# Patient Record
Sex: MALE
[De-identification: names, ages, dates, MRNs, and addresses within clinical notes are randomized per-mention and may not be internally consistent; named-entity substitution may affect disease eponyms.]

---

## 2018-09-06 ENCOUNTER — HOSPITAL ENCOUNTER (OUTPATIENT)
Dept: HOSPITAL 14 - H.ER | Age: 73
Setting detail: OBSERVATION
LOS: 1 days | Discharge: HOME | End: 2018-09-07
Attending: GENERAL ACUTE CARE HOSPITAL | Admitting: GENERAL ACUTE CARE HOSPITAL
Payer: MEDICARE

## 2018-09-06 DIAGNOSIS — K29.70: ICD-10-CM

## 2018-09-06 DIAGNOSIS — Z85.07: ICD-10-CM

## 2018-09-06 DIAGNOSIS — Z86.711: ICD-10-CM

## 2018-09-06 DIAGNOSIS — E78.00: ICD-10-CM

## 2018-09-06 DIAGNOSIS — R07.9: Primary | ICD-10-CM

## 2018-09-06 DIAGNOSIS — I25.10: ICD-10-CM

## 2018-09-06 DIAGNOSIS — Z95.5: ICD-10-CM

## 2018-09-06 DIAGNOSIS — Z95.1: ICD-10-CM

## 2018-09-06 DIAGNOSIS — Z91.013: ICD-10-CM

## 2018-09-06 DIAGNOSIS — I10: ICD-10-CM

## 2018-09-06 LAB
ALBUMIN SERPL-MCNC: 3.7 G/DL (ref 3.5–5)
ALBUMIN/GLOB SERPL: 1.4 {RATIO} (ref 1–2.1)
ALT SERPL-CCNC: 45 U/L (ref 21–72)
APTT BLD: 28 SECONDS (ref 25.6–37.1)
AST SERPL-CCNC: 68 U/L (ref 17–59)
BASOPHILS # BLD AUTO: 0 K/UL (ref 0–0.2)
BASOPHILS NFR BLD: 0.2 % (ref 0–2)
BNP SERPL-MCNC: 266 PG/ML (ref 0–900)
BUN SERPL-MCNC: 19 MG/DL (ref 9–20)
CALCIUM SERPL-MCNC: 8.8 MG/DL (ref 8.4–10.2)
EOSINOPHIL # BLD AUTO: 0.1 K/UL (ref 0–0.7)
EOSINOPHIL NFR BLD: 1.1 % (ref 0–4)
ERYTHROCYTE [DISTWIDTH] IN BLOOD BY AUTOMATED COUNT: 12.4 % (ref 11.5–14.5)
GFR NON-AFRICAN AMERICAN: > 60
HGB BLD-MCNC: 12.5 G/DL (ref 12–18)
INR PPP: 1
LYMPHOCYTES # BLD AUTO: 1.2 K/UL (ref 1–4.3)
LYMPHOCYTES NFR BLD AUTO: 12.9 % (ref 20–40)
MCH RBC QN AUTO: 31.6 PG (ref 27–31)
MCHC RBC AUTO-ENTMCNC: 33.4 G/DL (ref 33–37)
MCV RBC AUTO: 94.5 FL (ref 80–94)
MONOCYTES # BLD: 0.6 K/UL (ref 0–0.8)
MONOCYTES NFR BLD: 7 % (ref 0–10)
NEUTROPHILS # BLD: 7.3 K/UL (ref 1.8–7)
NEUTROPHILS NFR BLD AUTO: 78.8 % (ref 50–75)
NRBC BLD AUTO-RTO: 0 % (ref 0–0)
PLATELET # BLD: 252 K/UL (ref 130–400)
PMV BLD AUTO: 8.1 FL (ref 7.2–11.7)
PROTHROMBIN TIME: 10.7 SECONDS (ref 9.8–13.1)
RBC # BLD AUTO: 3.94 MIL/UL (ref 4.4–5.9)
WBC # BLD AUTO: 9.2 K/UL (ref 4.8–10.8)

## 2018-09-06 PROCEDURE — 36415 COLL VENOUS BLD VENIPUNCTURE: CPT

## 2018-09-06 PROCEDURE — 80053 COMPREHEN METABOLIC PANEL: CPT

## 2018-09-06 PROCEDURE — 84100 ASSAY OF PHOSPHORUS: CPT

## 2018-09-06 PROCEDURE — 93005 ELECTROCARDIOGRAM TRACING: CPT

## 2018-09-06 PROCEDURE — 85025 COMPLETE CBC W/AUTO DIFF WBC: CPT

## 2018-09-06 PROCEDURE — 83880 ASSAY OF NATRIURETIC PEPTIDE: CPT

## 2018-09-06 PROCEDURE — 99285 EMERGENCY DEPT VISIT HI MDM: CPT

## 2018-09-06 PROCEDURE — 71046 X-RAY EXAM CHEST 2 VIEWS: CPT

## 2018-09-06 PROCEDURE — 86850 RBC ANTIBODY SCREEN: CPT

## 2018-09-06 PROCEDURE — 86900 BLOOD TYPING SEROLOGIC ABO: CPT

## 2018-09-06 PROCEDURE — 85730 THROMBOPLASTIN TIME PARTIAL: CPT

## 2018-09-06 PROCEDURE — 85610 PROTHROMBIN TIME: CPT

## 2018-09-06 PROCEDURE — 84484 ASSAY OF TROPONIN QUANT: CPT

## 2018-09-06 RX ADMIN — PANTOPRAZOLE SODIUM SCH MG: 40 TABLET, DELAYED RELEASE ORAL at 22:09

## 2018-09-06 NOTE — ED PDOC
HPI: Chest Pain


Time Seen by Provider: 09/06/18 16:54


Chief Complaint (Nursing): Chest Pain


Chief Complaint (Provider): Chest pain


History Per: Patient


History/Exam Limitations: no limitations


Onset/Duration Of Symptoms: Hrs


Current Symptoms Are (Timing): Gone Now


Quality: Squeezing


Nitro Therapy Administered: 1, Per EMS, Complete Relief


Additional History Per: Patient


Additional Complaint(s): 


74yo male, history of hypertension, CAD, gastritis, pancreatic cancer, comes to 

ER for evaluation of chest pain, onset 30 minutes prior to arrival while 

driving his car. Patient states pain was epigastric and radiating to mid-sternum

, pressure-like and constant. Patient was given NTG SL by EMS after which he 

reports feeling much better. He denies any shortness of breath or vomiting; he 

does have associated nausea. Patient states he was feeling normal state of 

health prior to chest pain but states this morning when taking his blood 

pressure, it was higher than usual. On my evaluation, patient is asymptomatic 

and reports feeling much better.





PMD: Clinic





Past Medical History


Reviewed: Historical Data, Nursing Documentation, Vital Signs


Vital Signs: 


 Last Vital Signs











Temp  97.9 F   09/07/18 12:25


 


Pulse  59 L  09/07/18 12:25


 


Resp  18   09/07/18 12:25


 


BP  152/69 H  09/07/18 12:25


 


Pulse Ox  98   09/07/18 12:25














- Medical History


PMH: CAD, Gastritis, HTN, Hypercholesterolemia, Malignancy (pancreatic cancer)


   Denies: HIV, Chronic Kidney Disease





- Surgical History


Surgical History: CABG, Hernia Repair





- Family History


Family History: States: Hypertension





- Immunization History


Hx Tetanus Toxoid Vaccination: No


Hx Influenza Vaccination: Yes


Hx Pneumococcal Vaccination: No





- Home Medications


Home Medications: 


 Ambulatory Orders











 Medication  Instructions  Recorded


 


Aspirin/Dipyridamole [Aggrenox 25 1 cap PO Q12 09/06/18





mg-200 mg Capsule]  


 


Atorvastatin [Lipitor] 10 mg PO HS 09/06/18


 


Carvedilol [Coreg] 3.125 mg PO Q12 09/06/18


 


Multivitamin [Multi-Vitamin Daily] 1 tab PO DAILY 09/06/18


 


Aluminum Hydroxide/Magnesium H 30 ml PO QID PRN #1 udc 09/07/18





[Maalox 30 ml]  


 


Amlodipine Besylate/Benazepril 1 cap PO DAILY #30 cap 09/07/18





[Lotrel 10 mg-40 mg]  


 


Pantoprazole Sodium [Protonix] 40 mg PO DAILY #30 ect 09/07/18


 


Pantoprazole [Protonix EC Tab] 40 mg PO DAILY #14 ect 09/07/18














- Allergies


Allergies/Adverse Reactions: 


 Allergies











Allergy/AdvReac Type Severity Reaction Status Date / Time


 


clopidogrel bisulfate Allergy  RASH Verified 09/06/18 16:47





[From Plavix]     


 


shellfish derived Allergy  VOMITING Verified 09/06/18 16:47














Review of Systems


ROS Statement: Except As Marked, All Systems Reviewed And Found Negative


Cardiovascular: Positive for: Chest Pain (now resolved)


Respiratory: Negative for: Shortness of Breath


Gastrointestinal: Positive for: Nausea.  Negative for: Vomiting





Physical Exam





- Reviewed


Nursing Documentation Reviewed: Yes


Vital Signs Reviewed: Yes





- Physical Exam


Appears: Positive for: Well, Non-toxic, No Acute Distress


Head Exam: Positive for: ATRAUMATIC, NORMAL INSPECTION, NORMOCEPHALIC


Skin: Positive for: Normal Color, Warm, DRY


Eye Exam: Positive for: EOMI, Normal appearance, PERRL


ENT: Positive for: Normal ENT Inspection


Neck: Positive for: Normal, Painless ROM


Cardiovascular/Chest: Positive for: Regular Rate, Rhythm


Respiratory: Positive for: CNT, Normal Breath Sounds


Gastrointestinal/Abdominal: Positive for: Normal Exam, Soft


Back: Positive for: Normal Inspection


Extremity: Positive for: Normal ROM


Neurologic/Psych: Positive for: Alert, Oriented





- Laboratory Results


Result Diagrams: 


 09/06/18 17:34





 09/06/18 17:34





- ECG


ECG: Positive for: Interpreted By Me, Viewed By Me


ECG Rhythm: Positive for: Normal QRS, Normal ST Segment, Sinus Rhythm.  

Negative for: ST/T Changes


Rate: 66


O2 Sat by Pulse Oximetry: 100 (RA)


Pulse Ox Interpretation: Normal





Medical Decision Making


Medical Decision Making: 


Impression: Chest pain w/ cardiac risk factors


Plan:


-- Chest x-ray


-- EKG


-- Labs





1926


Case discussed with Dr Regan, and patient to be admitted to [facility] due to 

chest pain.











--------------------------------------------------------------------------------

-----------------


Scribe Attestation:


Documented by Azul Wagner, acting as a scribe for Antonia Preston MD





Provider Scribe Attestation:


All medical record entries made by the Scribe were at my direction and 

personally dictated by me. I have reviewed the chart and agree that the record 

accurately reflects my personal performance of the history, physical exam, 

medical decision making, and the department course for this patient. I have 

also personally directed, reviewed, and agree with the discharge instructions 

and disposition.





Disposition





- Clinical Impression


Clinical Impression: 


 CAD (coronary artery disease), Chest pain








- Disposition


Disposition Time: 19:00


Condition: FAIR





- Pt Status Changed To:


Hospital Disposition Of: Observation





- POA


Present On Arrival: None

## 2018-09-06 NOTE — RAD
HISTORY:

Chest pain  



COMPARISON:

Chest x-ray performed 11/17/16 



TECHNIQUE:

Chest PA and lateral



FINDINGS:





LUNGS:

No focal consolidation.



Please note that chest x-ray has limited sensitivity for the 

detection of pulmonary masses.



PLEURA:

No significant pleural effusion identified. No definite pneumothorax .



CARDIOVASCULAR:

Median sternotomy wires with evidence of CABG. 



OSSEOUS STRUCTURES:

 Degenerative changes of the spine.



VISUALIZED UPPER ABDOMEN:

Unremarkable.



OTHER FINDINGS:

None.



IMPRESSION:

Median sternotomy wires with evidence of CABG.

## 2018-09-06 NOTE — CP.PCM.HP
<[place] - Last Filed: 18 00:56>





History of Present Illness





- History of Present Illness


History of Present Illness: 


73 yr old M presents to ED via ambulance with complaint of epigastric pain 

radiating upwards which started at 5pm while he was driving. PMHx includes CAD s

/p stent and CABG (triple bypass), HTN, pancreatic cancer, peripheral pulmonary 

emboli (2016) and gastritis. Epigastric pain was 10/10, radiating upwards and 

associated symptoms were sweating and nausea. Denies vomiting, SOB, fever, left 

arm pain, dizziness, palpitations, dysuria or syncope. Reports he was able to 

drive home, called 911; he took 4 baby aspirins as instructed; EMS did an EKG 

in the ambulance and told patient it was normal. Patient reports he sees his 

cardiologist regularly-Dr. Ramirez and had a normal nuclear stress test 3 

months ago. Reports compliance with all his medications except that for 

gastritis. Pain resolved once in ED.





PMD: Dr. Sarmiento-[facility]


Specialists: Dr. Ramirez-cardiology; Mahin Myrick-Surgical 

Oncologist ([facility])





PMHx: CAD s/p CABG (triple bypass), HTN, pancreatic cancer, peripheral PE and 

gastritis


SurgHx: resection of pancreatic tail (); cardiac stent and CABG (triple 

bypass) ; right hernia repair 


FMHx: mother  at 96-HTN; father  at 95-prostate cancer; siblings pased 

away from Etoh abuse complications and AIDS complications


SocHx: denies tobacco, reports occasional beer , denies drugs


Medications: Amlodipine besylate 10mg/Benzapril 40mg; Aspirin 25mg/ 

Dipyridamole 200mg PO Q12, Atorvastatin 10mg PO QHS, Coreg 3.125mg PO Q12; 

Multivitamin 1 tab QD


Allergies: Plavix


Code status: Full code


Emergency contact: Kassie Feldman (wife) [phone]





ER course: /88 mmHg, HR 67 bpm, Temp 98.3F, Resp rate 18, SpO2 100% on 2L 

supplemental O2 via nasal cannula


-EKG: normal sinus rhythm at 66 bpm, no significant ST-T changes


-troponin: < 0.0120


-CBC wnl, CMP: AST 68-the rest wnl, coags wnl


-CXR: no focal consolidation, median sternotomy wires with evidence of CABG


-Urine drug screen: negative


-ED treatment: 2L supplemental O2 via nasal cannula








Present on Admission





- Present on Admission


Any Indicators Present on Admission: No


History of DVT/PE: Yes


History of Uncontrolled Diabetes: No


Urinary Catheter: No


Decubitus Ulcer Present: No


History Surgical Site Infection Following: None





Review of Systems





- Constitutional


Constitutional: absent: Chills





- EENT


Eyes: absent: Change in Vision


Ears: absent: Dizziness


Nose/Mouth/Throat: absent: Sore Throat





- Cardiovascular


Cardiovascular: Chest Pain, Diaphoresis.  absent: Palpitations, Syncope





- Respiratory


Respiratory: absent: Cough, Hemoptysis





- Gastrointestinal


Gastrointestinal: Abdominal Pain (epigastric), Heartburn, Nausea.  absent: 

Constipation, Diarrhea, Hematemesis, Hematochezia, Vomiting





- Genitourinary


Genitourinary: absent: Difficulty Urinating, Dysuria





- Musculoskeletal


Musculoskeletal: absent: Arthralgias, Radiating Pain into Limb





- Integumentary


Integumentary: absent: Bleeding Lesions





- Neurological


Neurological: absent: Confusion, Dizziness, Headaches, Weakness





- Endocrine


Endocrine: absent: Polydipsia, Polyphagia, Polyuria





- Hematologic/Lymphatic


Hematologic: absent: Easy Bleeding, Easy Bruising





Past Patient History





- Infectious Disease


Hx of Infectious Diseases: None





- Past Medical History & Family History


Past Medical History?: Yes





- Past Social History


Smoking Status: Never Smoked





- CARDIAC


Hx Hypercholesterolemia: Yes


Hx Hypertension: Yes





- PULMONARY


Hx Respiratory Disorders: No





- NEUROLOGICAL


Hx Neurological Disorder: No





- HEENT


Hx HEENT Problems: No





- RENAL


Hx Chronic Kidney Disease: No





- ENDOCRINE/METABOLIC


Hx Endocrine Disorders: No





- HEMATOLOGICAL/ONCOLOGICAL


Hx Human Immunodeficiency Virus (HIV): No





- INTEGUMENTARY


Hx Dermatological Problems: No





- MUSCULOSKELETAL/RHEUMATOLOGICAL


Hx Musculoskeletal Disorders: No


Hx Falls: No





- GASTROINTESTINAL


Hx Gastritis: Yes





- GENITOURINARY/GYNECOLOGICAL


Hx Genitourinary Disorders: No





- PSYCHIATRIC


Hx Psychophysiologic Disorder: No


Hx Substance Use: No





- SURGICAL HISTORY


Hx Coronary Artery Bypass Graft: Yes





- ANESTHESIA


Hx Anesthesia: Yes


Hx Anesthesia Reactions: No





Meds


Allergies/Adverse Reactions: 


 Allergies











Allergy/AdvReac Type Severity Reaction Status Date / Time


 


clopidogrel bisulfate Allergy  RASH Verified 18 16:47





[From Plavix]     


 


shellfish derived Allergy  VOMITING Verified 09/06/18 16:47














Physical Exam





- Constitutional


Appears: No Acute Distress





- Head Exam


Head Exam: ATRAUMATIC, NORMOCEPHALIC





- Eye Exam


Eye Exam: EOMI, PERRL





- ENT Exam


ENT Exam: Mucous Membranes Moist





- Neck Exam


Neck exam: Positive for: Full Rom.  Negative for: Lymphadenopathy





- Respiratory Exam


Respiratory Exam: Clear to Auscultation Bilateral, NORMAL BREATHING PATTERN.  

absent: Rales





- Cardiovascular Exam


Cardiovascular Exam: REGULAR RHYTHM, +S1, +S2





- GI/Abdominal Exam


GI & Abdominal Exam: Normal Bowel Sounds, Soft (obese).  absent: Tenderness





- Extremities Exam


Extremities exam: Positive for: full ROM.  Negative for: calf tenderness, pedal 

edema





- Back Exam


Back exam: absent: CVA tenderness (L), CVA tenderness (R)





- Neurological Exam


Neurological exam: Alert, CN II-XII Intact, Oriented x3





- Psychiatric Exam


Psychiatric exam: Normal Affect, Normal Mood





- Skin


Skin Exam: Dry, Normal Color, Warm





Results





- Vital Signs


Recent Vital Signs: 





 Last Vital Signs











Temp  98.3 F   18 16:50


 


Pulse  66   18 19:33


 


Resp  18   18 16:50


 


BP  113/88   18 16:50


 


Pulse Ox  100   18 19:33














- Labs


Result Diagrams: 


 18 17:34





 18 17:34


Labs: 





 Laboratory Results - last 24 hr











  18





  17:34 17:34 17:34


 


WBC   9.2 


 


RBC   3.94 L 


 


Hgb   12.5 


 


Hct   37.2 


 


MCV   94.5 H 


 


MCH   31.6 H 


 


MCHC   33.4 


 


RDW   12.4 


 


Plt Count   252 


 


MPV   8.1 


 


Neut % (Auto)   78.8 H 


 


Lymph % (Auto)   12.9 L 


 


Mono % (Auto)   7.0 


 


Eos % (Auto)   1.1 


 


Baso % (Auto)   0.2 


 


Neut # (Auto)   7.3 H 


 


Lymph # (Auto)   1.2 


 


Mono # (Auto)   0.6 


 


Eos # (Auto)   0.1 


 


Baso # (Auto)   0.0 


 


PT    10.7


 


INR    1.0


 


APTT    28.0


 


Sodium  139  


 


Potassium  3.9  


 


Chloride  108 H  


 


Carbon Dioxide  24  


 


Anion Gap  11  


 


BUN  19  


 


Creatinine  0.9  


 


Est GFR ( Amer)  > 60  


 


Est GFR (Non-Af Amer)  > 60  


 


Random Glucose  97  


 


Calcium  8.8  


 


Phosphorus  2.7  


 


Total Bilirubin  0.3  


 


AST  68 H D  


 


ALT  45  


 


Alkaline Phosphatase  53  


 


Troponin I  < 0.0120  


 


NT-Pro-B Natriuret Pep  266  


 


Total Protein  6.4  


 


Albumin  3.7  


 


Globulin  2.7  


 


Albumin/Globulin Ratio  1.4  


 


Urine Opiates Screen   


 


Urine Methadone Screen   


 


Ur Barbiturates Screen   


 


Ur Phencyclidine Scrn   


 


Ur Amphetamines Screen   


 


U Benzodiazepines Scrn   


 


U Oth Cocaine Metabols   


 


U Cannabinoids Screen   


 


Blood Type   


 


Antibody Screen   


 


BBK History Checked   














  18





  17:34 18:41


 


WBC  


 


RBC  


 


Hgb  


 


Hct  


 


MCV  


 


MCH  


 


MCHC  


 


RDW  


 


Plt Count  


 


MPV  


 


Neut % (Auto)  


 


Lymph % (Auto)  


 


Mono % (Auto)  


 


Eos % (Auto)  


 


Baso % (Auto)  


 


Neut # (Auto)  


 


Lymph # (Auto)  


 


Mono # (Auto)  


 


Eos # (Auto)  


 


Baso # (Auto)  


 


PT  


 


INR  


 


APTT  


 


Sodium  


 


Potassium  


 


Chloride  


 


Carbon Dioxide  


 


Anion Gap  


 


BUN  


 


Creatinine  


 


Est GFR ( Amer)  


 


Est GFR (Non-Af Amer)  


 


Random Glucose  


 


Calcium  


 


Phosphorus  


 


Total Bilirubin  


 


AST  


 


ALT  


 


Alkaline Phosphatase  


 


Troponin I  


 


NT-Pro-B Natriuret Pep  


 


Total Protein  


 


Albumin  


 


Globulin  


 


Albumin/Globulin Ratio  


 


Urine Opiates Screen   Negative


 


Urine Methadone Screen   Negative


 


Ur Barbiturates Screen   Negative


 


Ur Phencyclidine Scrn   Negative


 


Ur Amphetamines Screen   Negative


 


U Benzodiazepines Scrn   Negative


 


U Oth Cocaine Metabols   Negative


 


U Cannabinoids Screen   Negative


 


Blood Type  A POSITIVE 


 


Antibody Screen  Negative 


 


BBK History Checked  Patient has bt 














Assessment & Plan





- Assessment and Plan (Free Text)


Assessment: 


73 yr old M admitted for chest pain with PMHx CAD s/p CABG (triple bypass), HTN

, pancreatic cancer, peripheral PE and gastritis.





Chest pain


-acute, r/o ACS vs gastritis vs costochondritis  


-EKG and 1st troponin wnl


-admit to tele


-serial troponin, f/u repeat EKG in AM


-protonix 40 mg PO QD





Hypertension


-chronic, controlled


-continue home medications (Amlodipine besylate 10mg/Benzapril 40mg; Aspirin 

25mg/ Dipyridamole 200mg PO Q12)





CAD s/p CABG (triple bypass) and stent


-chronic, controlled (reports normal nuclear stress test 3 months ago)


-continue home medicationsAtorvastatin 10mg PO QHS, Coreg 3.125mg PO Q12





DVT prophylaxis


-hx peripheral PE's


-Lovenox 40 mg SC QD








- Date & Time


Date: 18


Time: 19:40





<Hal Richardson D - Last Filed: 18 09:26>





Results





- Vital Signs


Recent Vital Signs: 





 Last Vital Signs











Temp  98 F   18 08:00


 


Pulse  61   18 08:00


 


Resp  20   18 08:00


 


BP  153/73 H  18 08:00


 


Pulse Ox  98   18 08:00














- Labs


Result Diagrams: 


 18 17:34





 18 17:34


Labs: 





 Laboratory Results - last 24 hr











  18





  17:34 17:34 17:34


 


WBC   9.2 


 


RBC   3.94 L 


 


Hgb   12.5 


 


Hct   37.2 


 


MCV   94.5 H 


 


MCH   31.6 H 


 


MCHC   33.4 


 


RDW   12.4 


 


Plt Count   252 


 


MPV   8.1 


 


Neut % (Auto)   78.8 H 


 


Lymph % (Auto)   12.9 L 


 


Mono % (Auto)   7.0 


 


Eos % (Auto)   1.1 


 


Baso % (Auto)   0.2 


 


Neut # (Auto)   7.3 H 


 


Lymph # (Auto)   1.2 


 


Mono # (Auto)   0.6 


 


Eos # (Auto)   0.1 


 


Baso # (Auto)   0.0 


 


PT    10.7


 


INR    1.0


 


APTT    28.0


 


Sodium  139  


 


Potassium  3.9  


 


Chloride  108 H  


 


Carbon Dioxide  24  


 


Anion Gap  11  


 


BUN  19  


 


Creatinine  0.9  


 


Est GFR ( Amer)  > 60  


 


Est GFR (Non-Af Amer)  > 60  


 


Random Glucose  97  


 


Calcium  8.8  


 


Phosphorus  2.7  


 


Total Bilirubin  0.3  


 


AST  68 H D  


 


ALT  45  


 


Alkaline Phosphatase  53  


 


Troponin I  < 0.0120  


 


NT-Pro-B Natriuret Pep  266  


 


Total Protein  6.4  


 


Albumin  3.7  


 


Globulin  2.7  


 


Albumin/Globulin Ratio  1.4  


 


Urine Opiates Screen   


 


Urine Methadone Screen   


 


Ur Barbiturates Screen   


 


Ur Phencyclidine Scrn   


 


Ur Amphetamines Screen   


 


U Benzodiazepines Scrn   


 


U Oth Cocaine Metabols   


 


U Cannabinoids Screen   


 


Blood Type   


 


Antibody Screen   


 


BBK History Checked   














  18





  17:34 18:41 00:45


 


WBC   


 


RBC   


 


Hgb   


 


Hct   


 


MCV   


 


MCH   


 


MCHC   


 


RDW   


 


Plt Count   


 


MPV   


 


Neut % (Auto)   


 


Lymph % (Auto)   


 


Mono % (Auto)   


 


Eos % (Auto)   


 


Baso % (Auto)   


 


Neut # (Auto)   


 


Lymph # (Auto)   


 


Mono # (Auto)   


 


Eos # (Auto)   


 


Baso # (Auto)   


 


PT   


 


INR   


 


APTT   


 


Sodium   


 


Potassium   


 


Chloride   


 


Carbon Dioxide   


 


Anion Gap   


 


BUN   


 


Creatinine   


 


Est GFR ( Amer)   


 


Est GFR (Non-Af Amer)   


 


Random Glucose   


 


Calcium   


 


Phosphorus   


 


Total Bilirubin   


 


AST   


 


ALT   


 


Alkaline Phosphatase   


 


Troponin I    < 0.0120


 


NT-Pro-B Natriuret Pep   


 


Total Protein   


 


Albumin   


 


Globulin   


 


Albumin/Globulin Ratio   


 


Urine Opiates Screen   Negative 


 


Urine Methadone Screen   Negative 


 


Ur Barbiturates Screen   Negative 


 


Ur Phencyclidine Scrn   Negative 


 


Ur Amphetamines Screen   Negative 


 


U Benzodiazepines Scrn   Negative 


 


U Oth Cocaine Metabols   Negative 


 


U Cannabinoids Screen   Negative 


 


Blood Type  A POSITIVE  


 


Antibody Screen  Negative  


 


BBK History Checked  Patient has bt  














Attending/Attestation





- Attestation


I have personally seen and examined this patient.: Yes


I have fully participated in the care of the patient.: Yes


I have reviewed all pertinent clinical information: Yes

## 2018-09-07 VITALS — TEMPERATURE: 97.9 F | SYSTOLIC BLOOD PRESSURE: 152 MMHG | RESPIRATION RATE: 18 BRPM | DIASTOLIC BLOOD PRESSURE: 69 MMHG

## 2018-09-07 VITALS — OXYGEN SATURATION: 100 % | HEART RATE: 66 BPM

## 2018-09-07 RX ADMIN — ASPIRIN AND DIPYRIDAMOLE SCH: 25; 200 CAPSULE, EXTENDED RELEASE ORAL at 10:24

## 2018-09-07 RX ADMIN — ASPIRIN AND DIPYRIDAMOLE SCH EA: 25; 200 CAPSULE, EXTENDED RELEASE ORAL at 12:14

## 2018-09-07 RX ADMIN — PANTOPRAZOLE SODIUM SCH MG: 40 TABLET, DELAYED RELEASE ORAL at 10:17

## 2018-09-07 NOTE — CARD
--------------- APPROVED REPORT --------------





Date of service: 09/06/2018



EKG Measurement

Heart [id number]

IA [id number]

[id number]

[id number]

[id number]



<Conclusion>

Normal sinus rhythm

Normal ECG

## 2018-09-07 NOTE — CP.PCM.DIS
<Xin Marquez - Last Filed: 09/07/18 17:01>





Provider





- Provider


Date of Admission: 


09/06/18 19:26





Attending physician: 


Hal Richardson MD





Time Spent in preparation of Discharge (in minutes): 30





Diagnosis





- Discharge Diagnosis


(1) Chest pain


Status: Acute   


Comment: Troponin x 3 neg, resolved on admission. F/u with Dr. Ramirez on 9/10/

18.   





(2) HTN (hypertension)


Status: Chronic   





(3) CAD (coronary artery disease)


Status: Chronic   





Hospital Course





- Lab Results


Lab Results: 


 Most Recent Lab Values











WBC  9.2 K/uL (4.8-10.8)   09/06/18  17:34    


 


RBC  3.94 Mil/uL (4.40-5.90)  L  09/06/18  17:34    


 


Hgb  12.5 g/dL (12.0-18.0)   09/06/18  17:34    


 


Hct  37.2 % (35.0-51.0)   09/06/18  17:34    


 


MCV  94.5 fl (80.0-94.0)  H  09/06/18  17:34    


 


MCH  31.6 pg (27.0-31.0)  H  09/06/18  17:34    


 


MCHC  33.4 g/dL (33.0-37.0)   09/06/18  17:34    


 


RDW  12.4 % (11.5-14.5)   09/06/18  17:34    


 


Plt Count  252 K/uL (130-400)   09/06/18  17:34    


 


MPV  8.1 fl (7.2-11.7)   09/06/18  17:34    


 


Neut % (Auto)  78.8 % (50.0-75.0)  H  09/06/18  17:34    


 


Lymph % (Auto)  12.9 % (20.0-40.0)  L  09/06/18  17:34    


 


Mono % (Auto)  7.0 % (0.0-10.0)   09/06/18  17:34    


 


Eos % (Auto)  1.1 % (0.0-4.0)   09/06/18  17:34    


 


Baso % (Auto)  0.2 % (0.0-2.0)   09/06/18  17:34    


 


Neut # (Auto)  7.3 K/uL (1.8-7.0)  H  09/06/18  17:34    


 


Lymph # (Auto)  1.2 K/uL (1.0-4.3)   09/06/18  17:34    


 


Mono # (Auto)  0.6 K/uL (0.0-0.8)   09/06/18  17:34    


 


Eos # (Auto)  0.1 K/uL (0.0-0.7)   09/06/18  17:34    


 


Baso # (Auto)  0.0 K/uL (0.0-0.2)   09/06/18  17:34    


 


PT  10.7 Seconds (9.8-13.1)   09/06/18  17:34    


 


INR  1.0   09/06/18  17:34    


 


APTT  28.0 Seconds (25.6-37.1)   09/06/18  17:34    


 


Sodium  139 mmol/l (132-148)   09/06/18  17:34    


 


Potassium  3.9 MMOL/L (3.6-5.0)   09/06/18  17:34    


 


Chloride  108 mmol/L ()  H  09/06/18  17:34    


 


Carbon Dioxide  24 mmol/L (22-30)   09/06/18  17:34    


 


Anion Gap  11  (10-20)   09/06/18  17:34    


 


BUN  19 mg/dl (9-20)   09/06/18  17:34    


 


Creatinine  0.9 mg/dl (0.8-1.5)   09/06/18  17:34    


 


Est GFR ( Amer)  > 60   09/06/18  17:34    


 


Est GFR (Non-Af Amer)  > 60   09/06/18  17:34    


 


Random Glucose  97 mg/dL ()   09/06/18  17:34    


 


Calcium  8.8 mg/dL (8.4-10.2)   09/06/18  17:34    


 


Phosphorus  2.7 mg/dl (2.5-4.5)   09/06/18  17:34    


 


Total Bilirubin  0.3 mg/dl (0.2-1.3)   09/06/18  17:34    


 


AST  68 U/L (17-59)  H D 09/06/18  17:34    


 


ALT  45 U/L (21-72)   09/06/18  17:34    


 


Alkaline Phosphatase  53 U/L ()   09/06/18  17:34    


 


Troponin I  < 0.0120 ng/mL (0.00-0.120)   09/07/18  10:30    


 


NT-Pro-B Natriuret Pep  266 pg/ml (0-900)   09/06/18  17:34    


 


Total Protein  6.4 G/DL (6.3-8.2)   09/06/18  17:34    


 


Albumin  3.7 g/dL (3.5-5.0)   09/06/18  17:34    


 


Globulin  2.7 gm/dL (2.2-3.9)   09/06/18  17:34    


 


Albumin/Globulin Ratio  1.4  (1.0-2.1)   09/06/18  17:34    


 


Urine Opiates Screen  Negative  (NEGATIVE)   09/06/18  18:41    


 


Urine Methadone Screen  Negative  (NEGATIVE)   09/06/18  18:41    


 


Ur Barbiturates Screen  Negative  (NEGATIVE)   09/06/18  18:41    


 


Ur Phencyclidine Scrn  Negative  (NEGATIVE)   09/06/18  18:41    


 


Ur Amphetamines Screen  Negative  (NEGATIVE)   09/06/18  18:41    


 


U Benzodiazepines Scrn  Negative  (NEGATIVE)   09/06/18  18:41    


 


U Oth Cocaine Metabols  Negative  (NEGATIVE)   09/06/18  18:41    


 


U Cannabinoids Screen  Negative  (NEGATIVE)   09/06/18  18:41    


 


Blood Type  A POSITIVE   09/06/18  17:34    


 


Antibody Screen  Negative   09/06/18  17:34    


 


BBK History Checked  Patient has bt   09/06/18  17:34    














- Hospital Course


Hospital Course: 





73 yr old M with PMHx CAD s/p CABG (triple bypass), HTN, pancreatic cancer, 

gastritis admitted for chest pain to r/o ACS. On admission EKG did not show 

evidence of ST-T wave changes, troponin x 3 negative. Patient was seen and 

examined at bedside this morning. Patient denies chest pain. Dr Alvarez was 

called, who covers for Dr. Ramirez ( Pt's cardiologist), and stated that 

patient had an stress test on 4/16/18 which was normal. Dr. Alvarez recommended 

that if Troponin 3 negative patient could be discharged home and follow up on 

Monday with Dr. Ramirez. Patient was informed, and agreed with plan. Patient 

stable for DC on current home medications, will add PPI, and Maalox PRN to 

manage gastritis. 








- Date & Time of H&P


Date of H&P: 09/06/18


Time of H&P: 19:40





Discharge Exam





- Head Exam


Head Exam: ATRAUMATIC, NORMOCEPHALIC





- Eye Exam


Eye Exam: Normal appearance





- ENT Exam


ENT Exam: Mucous Membranes Moist





- Respiratory Exam


Respiratory Exam: Clear to PA & Lateral, NORMAL BREATHING PATTERN.  absent: 

Rales, Rhonchi, Wheezes, Respiratory Distress, Stridor





- Cardiovascular Exam


Cardiovascular Exam: REGULAR RHYTHM, +S1, +S2





- GI/Abdominal Exam


GI & Abdominal Exam: Normal Bowel Sounds, Soft.  absent: Distended, Guarding, 

Rigid, Tenderness





- Extremities Exam


Extremities exam: normal inspection





- Neurological Exam


Neurological exam: Alert, Oriented x3





- Skin


Skin Exam: Dry, Intact, Normal Color





Discharge Plan





- Discharge Medications


Prescriptions: 


Aluminum Hydroxide/Magnesium H [Maalox 30 ml] 30 ml PO QID PRN #1 udc


 PRN Reason: epigastric pain


Amlodipine Besylate/Benazepril [Lotrel 10 mg-40 mg] 1 cap PO DAILY #30 cap


Pantoprazole [Protonix EC Tab] 40 mg PO DAILY #14 ect


Pantoprazole Sodium [Protonix] 40 mg PO DAILY #30 ect





- Follow Up Plan


Condition: IMPROVED


Disposition: HOME/ ROUTINE


Additional Instructions: 


Please return to emergency room if chest pain, SOB, palpitations or other 

worrisome symptoms. Follow up with your Cardiology next Monday 9/10/18.


Referrals: 


Isaac Sarmiento MD [Family Provider] - 


Herminio Alvarez MD [Staff Provider] - 





<Hal Richardson - Last Filed: 09/08/18 09:23>





Provider





- Provider


Date of Admission: 


09/06/18 19:26





Attending physician: 


Hal Richardson MD








Hospital Course





- Lab Results


Lab Results: 


 Most Recent Lab Values











WBC  9.2 K/uL (4.8-10.8)   09/06/18  17:34    


 


RBC  3.94 Mil/uL (4.40-5.90)  L  09/06/18  17:34    


 


Hgb  12.5 g/dL (12.0-18.0)   09/06/18  17:34    


 


Hct  37.2 % (35.0-51.0)   09/06/18  17:34    


 


MCV  94.5 fl (80.0-94.0)  H  09/06/18  17:34    


 


MCH  31.6 pg (27.0-31.0)  H  09/06/18  17:34    


 


MCHC  33.4 g/dL (33.0-37.0)   09/06/18  17:34    


 


RDW  12.4 % (11.5-14.5)   09/06/18  17:34    


 


Plt Count  252 K/uL (130-400)   09/06/18  17:34    


 


MPV  8.1 fl (7.2-11.7)   09/06/18  17:34    


 


Neut % (Auto)  78.8 % (50.0-75.0)  H  09/06/18  17:34    


 


Lymph % (Auto)  12.9 % (20.0-40.0)  L  09/06/18  17:34    


 


Mono % (Auto)  7.0 % (0.0-10.0)   09/06/18  17:34    


 


Eos % (Auto)  1.1 % (0.0-4.0)   09/06/18  17:34    


 


Baso % (Auto)  0.2 % (0.0-2.0)   09/06/18  17:34    


 


Neut # (Auto)  7.3 K/uL (1.8-7.0)  H  09/06/18  17:34    


 


Lymph # (Auto)  1.2 K/uL (1.0-4.3)   09/06/18  17:34    


 


Mono # (Auto)  0.6 K/uL (0.0-0.8)   09/06/18  17:34    


 


Eos # (Auto)  0.1 K/uL (0.0-0.7)   09/06/18  17:34    


 


Baso # (Auto)  0.0 K/uL (0.0-0.2)   09/06/18  17:34    


 


PT  10.7 Seconds (9.8-13.1)   09/06/18  17:34    


 


INR  1.0   09/06/18  17:34    


 


APTT  28.0 Seconds (25.6-37.1)   09/06/18  17:34    


 


Sodium  139 mmol/l (132-148)   09/06/18  17:34    


 


Potassium  3.9 MMOL/L (3.6-5.0)   09/06/18  17:34    


 


Chloride  108 mmol/L ()  H  09/06/18  17:34    


 


Carbon Dioxide  24 mmol/L (22-30)   09/06/18  17:34    


 


Anion Gap  11  (10-20)   09/06/18  17:34    


 


BUN  19 mg/dl (9-20)   09/06/18  17:34    


 


Creatinine  0.9 mg/dl (0.8-1.5)   09/06/18  17:34    


 


Est GFR ( Amer)  > 60   09/06/18  17:34    


 


Est GFR (Non-Af Amer)  > 60   09/06/18  17:34    


 


Random Glucose  97 mg/dL ()   09/06/18  17:34    


 


Calcium  8.8 mg/dL (8.4-10.2)   09/06/18  17:34    


 


Phosphorus  2.7 mg/dl (2.5-4.5)   09/06/18  17:34    


 


Total Bilirubin  0.3 mg/dl (0.2-1.3)   09/06/18  17:34    


 


AST  68 U/L (17-59)  H D 09/06/18  17:34    


 


ALT  45 U/L (21-72)   09/06/18  17:34    


 


Alkaline Phosphatase  53 U/L ()   09/06/18  17:34    


 


Troponin I  < 0.0120 ng/mL (0.00-0.120)   09/07/18  10:30    


 


NT-Pro-B Natriuret Pep  266 pg/ml (0-900)   09/06/18  17:34    


 


Total Protein  6.4 G/DL (6.3-8.2)   09/06/18  17:34    


 


Albumin  3.7 g/dL (3.5-5.0)   09/06/18  17:34    


 


Globulin  2.7 gm/dL (2.2-3.9)   09/06/18  17:34    


 


Albumin/Globulin Ratio  1.4  (1.0-2.1)   09/06/18  17:34    


 


Urine Opiates Screen  Negative  (NEGATIVE)   09/06/18  18:41    


 


Urine Methadone Screen  Negative  (NEGATIVE)   09/06/18  18:41    


 


Ur Barbiturates Screen  Negative  (NEGATIVE)   09/06/18  18:41    


 


Ur Phencyclidine Scrn  Negative  (NEGATIVE)   09/06/18  18:41    


 


Ur Amphetamines Screen  Negative  (NEGATIVE)   09/06/18  18:41    


 


U Benzodiazepines Scrn  Negative  (NEGATIVE)   09/06/18  18:41    


 


U Oth Cocaine Metabols  Negative  (NEGATIVE)   09/06/18  18:41    


 


U Cannabinoids Screen  Negative  (NEGATIVE)   09/06/18  18:41    


 


Blood Type  A POSITIVE   09/06/18  17:34    


 


Antibody Screen  Negative   09/06/18  17:34    


 


BBK History Checked  Patient has bt   09/06/18  17:34

## 2018-09-08 NOTE — CARD
--------------- APPROVED REPORT --------------





Date of service: 09/07/2018



EKG Measurement

Heart [id number]

MA 170P40

[id number]

[id number]

[id number]



<Conclusion>

Sinus bradycardia with sinus arrhythmia

Minimal voltage criteria for LVH, may be normal variant

Borderline ECG

## 2022-02-14 PROBLEM — H52.03 HYPEROPIA: Noted: 2022-02-14

## 2022-02-14 PROBLEM — H52.4 PRESBYOPIA: Noted: 2022-02-14

## 2022-04-19 ENCOUNTER — PREPPED CHART (OUTPATIENT)
Dept: URBAN - METROPOLITAN AREA CLINIC 63 | Facility: CLINIC | Age: 77
End: 2022-04-19

## 2022-04-19 PROBLEM — H52.03 HYPEROPIA: Noted: 2022-02-14

## 2022-04-19 PROBLEM — H52.4 PRESBYOPIA: Noted: 2022-02-14

## 2022-04-19 PROBLEM — H25.813 COMBINED SENILE CATARACT: Noted: 2022-04-19

## 2022-04-19 PROBLEM — H25.811 COMBINED SENILE CATARACT: Noted: 2022-04-19

## 2022-05-19 ENCOUNTER — PRE-OP CATARACT MEASUREMENTS (OUTPATIENT)
Dept: URBAN - METROPOLITAN AREA CLINIC 10 | Facility: CLINIC | Age: 77
End: 2022-05-19

## 2022-05-19 DIAGNOSIS — H25.813: ICD-10-CM

## 2022-05-19 PROCEDURE — 92012 INTRM OPH EXAM EST PATIENT: CPT

## 2022-05-19 ASSESSMENT — TONOMETRY
OD_IOP_MMHG: 20
OS_IOP_MMHG: 20

## 2022-05-19 ASSESSMENT — VISUAL ACUITY
OD_CC: 20/40
OS_CC: 20/100

## 2022-05-23 ENCOUNTER — SURGERY/PROCEDURE (OUTPATIENT)
Dept: URBAN - METROPOLITAN AREA SURGICAL CENTER 57 | Facility: SURGICAL CENTER | Age: 77
End: 2022-05-23

## 2022-05-23 DIAGNOSIS — H25.12: ICD-10-CM

## 2022-05-23 PROCEDURE — 66982 XCAPSL CTRC RMVL CPLX WO ECP: CPT

## 2022-05-24 ENCOUNTER — POST-OP CHECK (OUTPATIENT)
Dept: URBAN - METROPOLITAN AREA CLINIC 63 | Facility: CLINIC | Age: 77
End: 2022-05-24

## 2022-05-24 DIAGNOSIS — Z96.1: ICD-10-CM

## 2022-05-24 PROCEDURE — 99024 POSTOP FOLLOW-UP VISIT: CPT

## 2022-05-24 ASSESSMENT — TONOMETRY: OS_IOP_MMHG: 24

## 2022-05-24 ASSESSMENT — VISUAL ACUITY: OS_SC: 20/25-2

## 2022-06-03 ENCOUNTER — POST-OP CHECK (OUTPATIENT)
Dept: URBAN - METROPOLITAN AREA CLINIC 10 | Facility: CLINIC | Age: 77
End: 2022-06-03

## 2022-06-03 DIAGNOSIS — Z96.1: ICD-10-CM

## 2022-06-03 DIAGNOSIS — H25.811: ICD-10-CM

## 2022-06-03 PROCEDURE — 99024 POSTOP FOLLOW-UP VISIT: CPT

## 2022-06-03 PROCEDURE — 76519 ECHO EXAM OF EYE: CPT | Mod: 26,RT

## 2022-06-03 ASSESSMENT — VISUAL ACUITY
OS_SC: 20/30-1
OD_SC: 20/50

## 2022-06-03 ASSESSMENT — TONOMETRY
OD_IOP_MMHG: 21
OS_IOP_MMHG: 17

## 2022-06-03 ASSESSMENT — KERATOMETRY
OD_AXISANGLE2_DEGREES: 89
OD_AXISANGLE_DEGREES: 179
OS_K1POWER_DIOPTERS: 40.25
OD_K1POWER_DIOPTERS: 41.00
OS_K2POWER_DIOPTERS: 40.75
OS_AXISANGLE2_DEGREES: 153
OD_K2POWER_DIOPTERS: 41.25
OS_AXISANGLE_DEGREES: 63

## 2022-06-13 ENCOUNTER — SURGERY/PROCEDURE (OUTPATIENT)
Dept: URBAN - METROPOLITAN AREA SURGICAL CENTER 57 | Facility: SURGICAL CENTER | Age: 77
End: 2022-06-13

## 2022-06-13 DIAGNOSIS — H25.811: ICD-10-CM

## 2022-06-13 PROCEDURE — 66984 XCAPSL CTRC RMVL W/O ECP: CPT | Mod: 79,RT

## 2022-06-14 ENCOUNTER — POST-OP CHECK (OUTPATIENT)
Dept: URBAN - METROPOLITAN AREA CLINIC 63 | Facility: CLINIC | Age: 77
End: 2022-06-14

## 2022-06-14 DIAGNOSIS — Z96.1: ICD-10-CM

## 2022-06-14 PROCEDURE — 99024 POSTOP FOLLOW-UP VISIT: CPT

## 2022-06-14 ASSESSMENT — KERATOMETRY
OS_K2POWER_DIOPTERS: 40.75
OS_K1POWER_DIOPTERS: 40.25
OD_AXISANGLE2_DEGREES: 89
OS_AXISANGLE_DEGREES: 63
OD_AXISANGLE_DEGREES: 179
OS_AXISANGLE2_DEGREES: 153
OD_K2POWER_DIOPTERS: 41.25
OD_K1POWER_DIOPTERS: 41.00

## 2022-06-14 ASSESSMENT — TONOMETRY: OD_IOP_MMHG: 12

## 2022-06-14 ASSESSMENT — VISUAL ACUITY: OD_SC: 20/40-2

## 2022-06-21 ASSESSMENT — KERATOMETRY
OS_AXISANGLE2_DEGREES: 153
OS_K1POWER_DIOPTERS: 40.25
OD_K1POWER_DIOPTERS: 41.00
OS_AXISANGLE_DEGREES: 63
OS_K2POWER_DIOPTERS: 40.75
OD_AXISANGLE_DEGREES: 179
OD_AXISANGLE2_DEGREES: 89
OD_K2POWER_DIOPTERS: 41.25

## 2022-06-22 ENCOUNTER — POST-OP CHECK (OUTPATIENT)
Dept: URBAN - METROPOLITAN AREA CLINIC 63 | Facility: CLINIC | Age: 77
End: 2022-06-22

## 2022-06-22 DIAGNOSIS — Z96.1: ICD-10-CM

## 2022-06-22 PROCEDURE — 99024 POSTOP FOLLOW-UP VISIT: CPT

## 2022-06-22 ASSESSMENT — KERATOMETRY
OD_AXISANGLE2_DEGREES: 89
OS_K2POWER_DIOPTERS: 40.75
OD_K1POWER_DIOPTERS: 41.00
OS_AXISANGLE2_DEGREES: 153
OD_AXISANGLE_DEGREES: 179
OS_K1POWER_DIOPTERS: 40.25
OS_AXISANGLE_DEGREES: 63
OD_K2POWER_DIOPTERS: 41.25

## 2022-06-22 ASSESSMENT — VISUAL ACUITY
OS_SC: 20/30+1
OD_SC: 20/20-2

## 2022-06-22 ASSESSMENT — TONOMETRY
OS_IOP_MMHG: 17
OD_IOP_MMHG: 18

## 2022-07-15 ENCOUNTER — POST-OP CHECK (OUTPATIENT)
Dept: URBAN - METROPOLITAN AREA CLINIC 63 | Facility: CLINIC | Age: 77
End: 2022-07-15

## 2022-07-15 DIAGNOSIS — Z96.1: ICD-10-CM

## 2022-07-15 PROCEDURE — 99024 POSTOP FOLLOW-UP VISIT: CPT

## 2022-07-15 ASSESSMENT — TONOMETRY
OD_IOP_MMHG: 15
OS_IOP_MMHG: 14

## 2022-07-15 ASSESSMENT — VISUAL ACUITY
OS_SC: 20/30-2
OD_SC: 20/25-1

## 2022-07-15 ASSESSMENT — KERATOMETRY
OD_K2POWER_DIOPTERS: 41.25
OD_K1POWER_DIOPTERS: 41.00
OS_K1POWER_DIOPTERS: 40.25
OS_AXISANGLE_DEGREES: 63
OS_K2POWER_DIOPTERS: 40.75
OD_AXISANGLE2_DEGREES: 89
OS_AXISANGLE2_DEGREES: 153
OD_AXISANGLE_DEGREES: 179

## 2022-08-29 ENCOUNTER — FOLLOW UP (OUTPATIENT)
Dept: URBAN - METROPOLITAN AREA CLINIC 63 | Facility: CLINIC | Age: 77
End: 2022-08-29

## 2022-08-29 DIAGNOSIS — Z96.1: ICD-10-CM

## 2022-08-29 PROCEDURE — 99024 POSTOP FOLLOW-UP VISIT: CPT

## 2022-08-29 ASSESSMENT — VISUAL ACUITY
OD_SC: 20/20
OS_SC: 20/25-1

## 2022-08-29 ASSESSMENT — KERATOMETRY
OD_AXISANGLE2_DEGREES: 89
OS_K2POWER_DIOPTERS: 40.75
OS_K1POWER_DIOPTERS: 40.25
OD_K1POWER_DIOPTERS: 41.00
OD_AXISANGLE_DEGREES: 179
OD_K2POWER_DIOPTERS: 41.25
OS_AXISANGLE2_DEGREES: 153
OS_AXISANGLE_DEGREES: 63

## 2022-08-29 ASSESSMENT — TONOMETRY: OS_IOP_MMHG: 14

## 2024-08-01 ENCOUNTER — ESTABLISHED COMPREHENSIVE EXAM (OUTPATIENT)
Dept: URBAN - METROPOLITAN AREA CLINIC 63 | Facility: CLINIC | Age: 79
End: 2024-08-01

## 2024-08-01 DIAGNOSIS — H04.123: ICD-10-CM

## 2024-08-01 DIAGNOSIS — H02.884: ICD-10-CM

## 2024-08-01 DIAGNOSIS — H02.881: ICD-10-CM

## 2024-08-01 DIAGNOSIS — H52.4: ICD-10-CM

## 2024-08-01 PROCEDURE — 92014 COMPRE OPH EXAM EST PT 1/>: CPT

## 2024-08-01 PROCEDURE — 92015 DETERMINE REFRACTIVE STATE: CPT

## 2024-08-01 ASSESSMENT — KERATOMETRY
OD_AXISANGLE_DEGREES: 136
OD_K2POWER_DIOPTERS: 41.00
OD_AXISANGLE2_DEGREES: 46
OS_K1POWER_DIOPTERS: 40.50
OS_AXISANGLE_DEGREES: 80
OS_K2POWER_DIOPTERS: 41.00
OD_K1POWER_DIOPTERS: 40.75
OS_AXISANGLE2_DEGREES: 170

## 2024-08-01 ASSESSMENT — TONOMETRY
OS_IOP_MMHG: 17
OD_IOP_MMHG: 15

## 2024-08-01 ASSESSMENT — VISUAL ACUITY
OD_SC: 20/20-2
OS_SC: 20/25-1

## (undated) RX ORDER — PREDNISOLONE ACETATE 10 MG/ML: 1 SUSPENSION/ DROPS OPHTHALMIC